# Patient Record
Sex: MALE | Race: WHITE | NOT HISPANIC OR LATINO | Employment: FULL TIME | URBAN - NONMETROPOLITAN AREA
[De-identification: names, ages, dates, MRNs, and addresses within clinical notes are randomized per-mention and may not be internally consistent; named-entity substitution may affect disease eponyms.]

---

## 2024-06-16 ENCOUNTER — OFFICE VISIT (OUTPATIENT)
Dept: URGENT CARE | Facility: CLINIC | Age: 54
End: 2024-06-16
Payer: COMMERCIAL

## 2024-06-16 VITALS
DIASTOLIC BLOOD PRESSURE: 80 MMHG | OXYGEN SATURATION: 96 % | RESPIRATION RATE: 18 BRPM | TEMPERATURE: 97.5 F | HEART RATE: 85 BPM | SYSTOLIC BLOOD PRESSURE: 126 MMHG

## 2024-06-16 DIAGNOSIS — L03.012 PARONYCHIA OF FINGER, LEFT: Primary | ICD-10-CM

## 2024-06-16 PROCEDURE — 99203 OFFICE O/P NEW LOW 30 MIN: CPT

## 2024-06-16 RX ORDER — CEPHALEXIN 500 MG/1
500 CAPSULE ORAL EVERY 8 HOURS SCHEDULED
Qty: 21 CAPSULE | Refills: 0 | Status: SHIPPED | OUTPATIENT
Start: 2024-06-16 | End: 2024-06-23

## 2024-06-16 NOTE — PATIENT INSTRUCTIONS
Take prescribed medication as instructed.  Eat yogurt with live and active cultures and/or take a probiotic at least 3 hours before or after antibiotic dose. Monitor stool for diarrhea and/or blood. If this occurs, contact primary care doctor ASAP.    Tylenol for pain or fever.  Warm compresses/soaks multiple times throughout the day.  Recommended to follow-up with your family doctor if no improvement in symptoms.  Follow up with PCP in 3-5 days.  Proceed to  ER if symptoms worsen.    If tests are performed, our office will contact you with results only if changes need to made to the care plan discussed with you at the visit. You can review your full results on [facility].  Paronychia   WHAT YOU NEED TO KNOW:   Paronychia is an infection of your nail fold caused by bacteria or a fungus. The nail fold is the skin around your nail. Paronychia may happen suddenly and last for 6 weeks or longer. You may have paronychia on more than 1 finger or toe.  DISCHARGE INSTRUCTIONS:   Return to the emergency department if:   You have severe nail pain.    The inflammation spreads to your hand or arm.    Call your doctor if:   Your nail becomes loose, deformed, or falls off.    You have a large abscess on your nail.    You have questions or concerns about your condition or care.    Medicines:   Td vaccine  is a booster shot used to help prevent tetanus and diphtheria. The Td booster may be given to adolescents and adults every 10 years or for certain wounds and injuries.    Antibiotics:  This medicine will help fight or prevent an infection. It may be given as a pill, cream, or ointment.    Steroids:  This medicine will help decrease inflammation. It may be given as a pill, cream, or ointment.    Antifungal medicine:  This medicine helps kill fungus that may be causing your infection. It may be given as a cream or ointment.    NSAIDs:  These medicines decrease pain and swelling. NSAIDs are available without a doctor's  order. Ask your healthcare provider which medicine is right for you. Ask how much to take and when to take it. Take as directed. NSAIDs can cause stomach bleeding and kidney problems if not taken correctly.    Take your medicine as directed.  Contact your healthcare provider if you think your medicine is not helping or if you have side effects. Tell your provider if you are allergic to any medicine. Keep a list of the medicines, vitamins, and herbs you take. Include the amounts, and when and why you take them. Bring the list or the pill bottles to follow-up visits. Carry your medicine list with you in case of an emergency.    Self-care:   Soak your nail:  Soak your nail in a mixture of equal parts vinegar and water 3 or 4 times each day. This will help decrease inflammation.    Apply a warm compress:  Soak a washcloth in warm water and place it on your nail. This will help decrease inflammation.     Elevate:  Raise your nail above the level of your heart as often as you can. This will help decrease swelling and pain. Prop your nail on pillows or blankets to keep it elevated comfortably.     Use lotion:  Apply lotion after you wash your hands. This will prevent your skin from becoming too dry.    Prevent paronychia:   Avoid chemicals and allergens that may harm your skin and nails. This includes soaps, laundry detergents, and nail products.    Keep your nails clean and dry. Avoid soaking your nails in water. Use cotton-lined rubber gloves or wear 2 rubber gloves if you work with food or water. The gloves will help protect your nail folds.    Keep your nails short. Do not bite your nails, pick at your hangnails, suck your fingers, or wear fake nails. Bring your own nail tools when you go to the nail salon.    Follow up with your doctor as directed:  Write down your questions so you remember to ask them during your visits.  © Copyright [employer] 2023 Information is for End User's use only and may not be sold,  redistributed or otherwise used for commercial purposes.  The above information is an  only. It is not intended as medical advice for individual conditions or treatments. Talk to your doctor, nurse or pharmacist before following any medical regimen to see if it is safe and effective for you.

## 2024-06-16 NOTE — PROGRESS NOTES
[facility]        NAME: Lorne Jean is a 53 y.o. male  : 1970    MRN: [id number]  DATE: 2024  TIME: 11:59 AM    Assessment and Plan   Paronychia of finger, left [L03.012]  1. Paronychia of finger, left  cephalexin (KEFLEX) 500 mg capsule        Mild paronychia to the left fourth finger.  There is erythema, mild tenderness, warmth.  No obvious drainage or abscess.  Will start on Keflex.   encouraging warm compresses.  Advised to follow-up with family doctor.  Advised to the ER if any symptoms worsen.    Patient Instructions     Take prescribed medication as instructed.  Eat yogurt with live and active cultures and/or take a probiotic at least 3 hours before or after antibiotic dose. Monitor stool for diarrhea and/or blood. If this occurs, contact primary care doctor ASAP.    Tylenol for pain or fever.  Warm compresses/soaks multiple times throughout the day.  Recommended to follow-up with your family doctor if no improvement in symptoms.  Follow up with PCP in 3-5 days.  Proceed to  ER if symptoms worsen.    If tests are performed, our office will contact you with results only if changes need to made to the care plan discussed with you at the visit. You can review your full results on [facility].    Chief Complaint     Chief Complaint   Patient presents with    Infection     Left 4th finger injury on negro laundry basket approx 2 weeks ago.  Became red and swollen.  Applying bacitracin but not improving.           History of Present Illness       53-year-old male presents to the clinic for a wound on his left fourth finger that occurred about 2 weeks ago.  States that he scraped it on a laundry basket.  States that it became red and swollen.  Applying bacitracin without improvement.  Denies fever, chills, chest pain, shortness of breath, numbness, tingling, wound drainage.        Review of Systems   Review of Systems   Constitutional: Negative.    Respiratory: Negative.      Cardiovascular: Negative.    Musculoskeletal: Negative.    Skin:  Positive for wound (Left fourth finger).   Neurological: Negative.          Current Medications       Current Outpatient Medications:     cephalexin (KEFLEX) 500 mg capsule, Take 1 capsule (500 mg total) by mouth every 8 (eight) hours for 7 days, Disp: 21 capsule, Rfl: 0    Current Allergies     Allergies as of 06/16/2024 - Reviewed 06/16/2024   Allergen Reaction Noted    Aspirin Other (See Comments) 05/10/2018    Diphenhydramine Rash 05/10/2018    Ibuprofen Rash 05/10/2018            The following portions of the patient's history were reviewed and updated as appropriate: allergies, current medications, past family history, past medical history, past social history, past surgical history and problem list.     History reviewed. No pertinent past medical history.    History reviewed. No pertinent surgical history.    No family history on file.      Medications have been verified.        Objective   /80 (BP Location: Left arm)   Pulse 85   Temp 97.5 °F (36.4 °C) (Skin)   Resp 18   SpO2 96%        Physical Exam     Physical Exam  Constitutional:       General: He is not in acute distress.     Appearance: Normal appearance. He is not ill-appearing or diaphoretic.   HENT:      Head: Normocephalic.      Mouth/Throat:      Mouth: Mucous membranes are moist.      Pharynx: Oropharynx is clear.   Cardiovascular:      Rate and Rhythm: Normal rate and regular rhythm.      Pulses: Normal pulses.      Heart sounds: Normal heart sounds.   Pulmonary:      Effort: Pulmonary effort is normal. No respiratory distress.      Breath sounds: Normal breath sounds.   Skin:     General: Skin is warm and dry.      Capillary Refill: Capillary refill takes less than 2 seconds.      Findings: Erythema (mild erythema, tenderness, warmth to the nail fold of the left fourth finger.  There is no obvious open wound, drainage, abscess.  Normal range of motion.  Consistent  with paronychia.) present. No rash.   Neurological:      Mental Status: He is alert and oriented to person, place, and time.   Psychiatric:         Mood and Affect: Mood normal.

## 2024-09-15 ENCOUNTER — OFFICE VISIT (OUTPATIENT)
Dept: URGENT CARE | Facility: CLINIC | Age: 54
End: 2024-09-15
Payer: COMMERCIAL

## 2024-09-15 VITALS
TEMPERATURE: 98.3 F | RESPIRATION RATE: 18 BRPM | DIASTOLIC BLOOD PRESSURE: 70 MMHG | HEART RATE: 77 BPM | SYSTOLIC BLOOD PRESSURE: 118 MMHG | OXYGEN SATURATION: 98 %

## 2024-09-15 DIAGNOSIS — T78.40XA ALLERGIC REACTION TO DRUG, INITIAL ENCOUNTER: Primary | ICD-10-CM

## 2024-09-15 PROCEDURE — 99213 OFFICE O/P EST LOW 20 MIN: CPT

## 2024-09-15 RX ORDER — PREDNISONE 20 MG/1
40 TABLET ORAL DAILY
Qty: 10 TABLET | Refills: 0 | Status: SHIPPED | OUTPATIENT
Start: 2024-09-15 | End: 2024-09-20

## 2024-09-15 NOTE — PATIENT INSTRUCTIONS
Take the allergy medicine daily for the next 5-7 days  Take pepcid (famotidine) daily for the next 5-7 days (20mg)  Take the steroids as directed for the number of days directed.   Follow up with pcp in about 3-5 days for a recheck.

## 2024-09-15 NOTE — PROGRESS NOTES
[facility]        NAME: Lorne Jean is a 54 y.o. male  : 1970    MRN: [id number]  DATE: September 15, 2024  TIME: 2:27 PM    Assessment and Plan   Allergic reaction to drug, initial encounter [T78.40XA]  1. Allergic reaction to drug, initial encounter          No drooling or breathing difficulty noted.  Patient will call PCP tomorrow and inquire whether he is to continue taking his antibiotic as we are unsure if this allergic reaction stems from the Paxlovid he just completed or the antibiotic he is currently taking.  Patient was advised of red flag symptoms and when to call 911 or go to the emergency room and he verbalized understanding.    Patient Instructions     Take the allergy medicine daily for the next 5-7 days  Take pepcid (famotidine) daily for the next 5-7 days (20mg)  Take the steroids as directed for the number of days directed.   Follow up with pcp in about 3-5 days for a recheck.  Follow up with PCP in 3-5 days.  Proceed to  ER if symptoms worsen.    If tests are performed, our office will contact you with results only if changes need to made to the care plan discussed with you at the visit. You can review your full results on [facility].    Chief Complaint     Chief Complaint   Patient presents with    Rash     Rash started this morning all over face and chest.  Finished course of paxlovid and currently on Amoxicillin.  States it is slightly itchy.  Took Claritin before coming today.  Allergic to Benadryl         History of Present Illness       54-year-old male presents to the clinic with complaint of a rash that started this morning all over his face and chest.  He finished course of Paxlovid and is currently taking amoxicillin.  He states it is slightly itchy.  He took Claritin before coming today as he is allergic to Benadryl.  He denies any breathing or swallowing difficulty.      Review of Systems   Review of Systems   HENT:  Negative for drooling, trouble  swallowing and voice change.    Skin:  Positive for rash.         Current Medications       Current Outpatient Medications:     amoxicillin-clavulanate (AUGMENTIN) 875-125 mg per tablet, Take 1 tablet by mouth 2 (two) times a day, Disp: , Rfl:     Current Allergies     Allergies as of 09/15/2024 - Reviewed 09/15/2024   Allergen Reaction Noted    Aspirin Other (See Comments) 05/10/2018    Diphenhydramine Rash 05/10/2018    Ibuprofen Rash 05/10/2018            The following portions of the patient's history were reviewed and updated as appropriate: allergies, current medications, past family history, past medical history, past social history, past surgical history and problem list.     History reviewed. No pertinent past medical history.    History reviewed. No pertinent surgical history.    History reviewed. No pertinent family history.      Medications have been verified.        Objective   /70 (BP Location: Left arm)   Pulse 77   Temp 98.3 °F (36.8 °C) (Skin)   Resp 18   SpO2 98%        Physical Exam     Physical Exam  Vitals and nursing note reviewed.   Constitutional:       Appearance: Normal appearance.   HENT:      Head: Normocephalic and atraumatic.      Right Ear: External ear normal.      Left Ear: External ear normal.      Mouth/Throat:      Mouth: Mucous membranes are moist.      Pharynx: Oropharynx is clear.   Cardiovascular:      Rate and Rhythm: Normal rate and regular rhythm.      Pulses: Normal pulses.      Heart sounds: Normal heart sounds.   Pulmonary:      Effort: Pulmonary effort is normal.      Breath sounds: Normal breath sounds.   Skin:     General: Skin is warm.      Capillary Refill: Capillary refill takes less than 2 seconds.      Findings: Rash present.      Comments: Maculopapular urticarial rash noted to face, chest, and back   Neurological:      General: No focal deficit present.      Mental Status: He is alert and oriented to person, place, and time.